# Patient Record
Sex: MALE | Race: WHITE | NOT HISPANIC OR LATINO | Employment: FULL TIME | URBAN - METROPOLITAN AREA
[De-identification: names, ages, dates, MRNs, and addresses within clinical notes are randomized per-mention and may not be internally consistent; named-entity substitution may affect disease eponyms.]

---

## 2018-07-11 ENCOUNTER — NON-PROVIDER VISIT (OUTPATIENT)
Dept: OCCUPATIONAL MEDICINE | Facility: CLINIC | Age: 43
End: 2018-07-11

## 2018-07-11 DIAGNOSIS — Z02.1 PRE-EMPLOYMENT DRUG SCREENING: ICD-10-CM

## 2018-07-11 PROCEDURE — 99026 IN-HOSPITAL ON CALL SERVICE: CPT | Performed by: INTERNAL MEDICINE

## 2018-07-11 PROCEDURE — 80305 DRUG TEST PRSMV DIR OPT OBS: CPT | Performed by: INTERNAL MEDICINE

## 2018-07-17 LAB
AMP AMPHETAMINE: NORMAL
COC COCAINE: NORMAL
INT CON NEG: NORMAL
INT CON POS: NORMAL
MET METHAMPHETAMINES: NORMAL
OPI OPIATES: NORMAL
PCP PHENCYCLIDINE: NORMAL
POC DRUG COMMENT 753798-OCCUPATIONAL HEALTH: NORMAL
THC: NORMAL

## 2021-11-16 ENCOUNTER — APPOINTMENT (OUTPATIENT)
Dept: RADIOLOGY | Facility: IMAGING CENTER | Age: 46
End: 2021-11-16
Attending: STUDENT IN AN ORGANIZED HEALTH CARE EDUCATION/TRAINING PROGRAM
Payer: COMMERCIAL

## 2021-11-16 ENCOUNTER — OFFICE VISIT (OUTPATIENT)
Dept: URGENT CARE | Facility: PHYSICIAN GROUP | Age: 46
End: 2021-11-16
Payer: COMMERCIAL

## 2021-11-16 VITALS
TEMPERATURE: 98.2 F | HEART RATE: 89 BPM | SYSTOLIC BLOOD PRESSURE: 130 MMHG | RESPIRATION RATE: 16 BRPM | BODY MASS INDEX: 37.03 KG/M2 | WEIGHT: 250 LBS | DIASTOLIC BLOOD PRESSURE: 82 MMHG | OXYGEN SATURATION: 95 % | HEIGHT: 69 IN

## 2021-11-16 DIAGNOSIS — M25.521 RIGHT ELBOW PAIN: ICD-10-CM

## 2021-11-16 DIAGNOSIS — S52.124A CLOSED NONDISPLACED FRACTURE OF HEAD OF RIGHT RADIUS, INITIAL ENCOUNTER: ICD-10-CM

## 2021-11-16 PROCEDURE — 73080 X-RAY EXAM OF ELBOW: CPT | Mod: TC,RT | Performed by: RADIOLOGY

## 2021-11-16 PROCEDURE — 99203 OFFICE O/P NEW LOW 30 MIN: CPT | Performed by: STUDENT IN AN ORGANIZED HEALTH CARE EDUCATION/TRAINING PROGRAM

## 2021-11-17 NOTE — PROGRESS NOTES
"Subjective:   CHIEF COMPLAINT  Chief Complaint   Patient presents with   • Arm Swelling     R arm swelling/pain, painful to move or lift, limited range of motion x1 week d       HPI  Gee Santamaria is a 46 y.o. male who presents with a chief complaint of swelling of his right elbow, forearm and wrist which developed approxione 1.5 weeks ago.  Said the swelling initially developed along the anterior elbow and then moved distally down to his wrist and dorsal surface of his hand.  Says he is having mild achy discomfort of his elbow which is aggravated with general range of motion and improved with rest.  He has tried some ibuprofen which has provided mild relief of symptoms.  No additional modifying factors.  No history of trauma or surgery to the region.  No fevers, chills or body aches.  No history of blood clots, recent surgery or immobilization.    REVIEW OF SYSTEMS  General: no fever or chills  GI: no nausea or vomiting  See HPI for further details.    PAST MEDICAL HISTORY       SURGICAL HISTORY  patient denies any surgical history    ALLERGIES  No Known Allergies    CURRENT MEDICATIONS  Home Medications     Reviewed by Prashanth Zhong D.O. (Physician) on 11/16/21 at 1746  Med List Status: <None>   Medication Last Dose Status   Acetaminophen (TYLENOL 8 HOUR PO) PRN Active                SOCIAL HISTORY  Social History     Tobacco Use   • Smoking status: Never Smoker   • Smokeless tobacco: Never Used   Substance and Sexual Activity   • Alcohol use: Not Currently   • Drug use: Never   • Sexual activity: Not on file       FAMILY HISTORY  No family history on file.       Objective:   PHYSICAL EXAM  VITAL SIGNS: /82 (BP Location: Left arm, Patient Position: Sitting, BP Cuff Size: Large adult)   Pulse 89   Temp 36.8 °C (98.2 °F) (Temporal)   Resp 16   Ht 1.753 m (5' 9\")   Wt 113 kg (250 lb)   SpO2 95%   BMI 36.92 kg/m²     Gen: no acute distress, normal voice  Psych: normal affect, normal judgement, alert, " awake  Skin: dry, intact, moist mucosal membranes  Lungs: CTAB w/ symmetric expansion  CV: RRR w/o murmurs or clicks  MSK: Right elbow: Edema extending from radial margins of elbow to the dorsal surface of the hand.  No warmth, erythema or ecchymosis.  110 degrees elbow flexion with 5 degree extensor lag associated with mild discernible discomfort with AROM.  Minimal TTP over the radial head.  Distal sensation motor fully intact      RADIOLOGY RESULTS   DX-ELBOW-COMPLETE 3+ RIGHT    Result Date: 11/16/2021 11/16/2021 5:46 PM HISTORY/REASON FOR EXAM: Right elbow pain. TECHNIQUE/EXAM DESCRIPTION AND NUMBER OF VIEWS: 3 views of the RIGHT elbow. COMPARISON: FINDINGS: Bone mineralization is normal. There is a joint effusion. A discrete fracture is not seen however there is possibly a nondisplaced radial head fracture seen on lateral view only. There is degenerative change characterized by osteophytes involving the ulnar compartment.     1.  Joint effusion which suggest presence of nonvisualized fracture. There is a possible nondisplaced radial head fracture. 2.  Degenerative change.             Assessment/Plan:     1. Right elbow pain  DX-ELBOW-COMPLETE 3+ RIGHT   2. Closed nondisplaced fracture of head of right radius, initial encounter     Multiple views of the right elbow demonstrate a nondisplaced radial head fracture.  -Encouraged range of motion, ice, NSAIDs and elevation  -Provided a handout with home stretches and exercises  -Follow-up as needed    Differential diagnosis, natural history, supportive care, and indications for immediate follow-up discussed. All questions answered. Patient agrees with the plan of care.    Follow-up as needed if symptoms worsen or fail to improve to PCP, Urgent care or Emergency Room.    Please note that this dictation was created using voice recognition software. I have made a reasonable attempt to correct obvious errors, but I expect that there are errors of grammar and possibly  content that I did not discover before finalizing the note.